# Patient Record
Sex: FEMALE | Race: BLACK OR AFRICAN AMERICAN | NOT HISPANIC OR LATINO | Employment: FULL TIME | URBAN - METROPOLITAN AREA
[De-identification: names, ages, dates, MRNs, and addresses within clinical notes are randomized per-mention and may not be internally consistent; named-entity substitution may affect disease eponyms.]

---
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
## 2021-09-15 ENCOUNTER — TRANSFERRED RECORDS (OUTPATIENT)
Dept: HEALTH INFORMATION MANAGEMENT | Facility: CLINIC | Age: 21
End: 2021-09-15

## 2023-07-20 ENCOUNTER — NURSE TRIAGE (OUTPATIENT)
Dept: NURSING | Facility: CLINIC | Age: 23
End: 2023-07-20

## 2023-07-21 NOTE — TELEPHONE ENCOUNTER
Patient inquiring about rescheduling her pap smear.    Reason for Disposition    Question about upcoming scheduled test, no triage required and triager able to answer question    Protocols used: INFORMATION ONLY CALL - NO TRIAGE-A-    Geno Pena RN on 7/20/2023 at 7:43 PM

## 2023-08-09 ENCOUNTER — OFFICE VISIT (OUTPATIENT)
Dept: FAMILY MEDICINE | Facility: CLINIC | Age: 23
End: 2023-08-09
Payer: MEDICAID

## 2023-08-09 VITALS
OXYGEN SATURATION: 99 % | HEIGHT: 68 IN | WEIGHT: 276 LBS | DIASTOLIC BLOOD PRESSURE: 85 MMHG | RESPIRATION RATE: 15 BRPM | BODY MASS INDEX: 41.83 KG/M2 | HEART RATE: 84 BPM | SYSTOLIC BLOOD PRESSURE: 128 MMHG | TEMPERATURE: 98.3 F

## 2023-08-09 DIAGNOSIS — Z13.220 SCREENING FOR HYPERLIPIDEMIA: ICD-10-CM

## 2023-08-09 DIAGNOSIS — Z98.890 S/P REDUCTION MAMMOPLASTY: ICD-10-CM

## 2023-08-09 DIAGNOSIS — F32.1 CURRENT MODERATE EPISODE OF MAJOR DEPRESSIVE DISORDER WITHOUT PRIOR EPISODE (H): ICD-10-CM

## 2023-08-09 DIAGNOSIS — Z76.89 ENCOUNTER TO ESTABLISH CARE WITH NEW DOCTOR: ICD-10-CM

## 2023-08-09 DIAGNOSIS — Z00.00 ROUTINE GENERAL MEDICAL EXAMINATION AT A HEALTH CARE FACILITY: Primary | ICD-10-CM

## 2023-08-09 DIAGNOSIS — Z11.4 SCREENING FOR HIV (HUMAN IMMUNODEFICIENCY VIRUS): ICD-10-CM

## 2023-08-09 DIAGNOSIS — E66.01 CLASS 3 SEVERE OBESITY DUE TO EXCESS CALORIES WITH SERIOUS COMORBIDITY AND BODY MASS INDEX (BMI) OF 40.0 TO 44.9 IN ADULT (H): ICD-10-CM

## 2023-08-09 DIAGNOSIS — Z13.1 ENCOUNTER FOR SCREENING FOR DIABETES MELLITUS: ICD-10-CM

## 2023-08-09 DIAGNOSIS — Z11.59 NEED FOR HEPATITIS C SCREENING TEST: ICD-10-CM

## 2023-08-09 DIAGNOSIS — E66.813 CLASS 3 SEVERE OBESITY DUE TO EXCESS CALORIES WITH SERIOUS COMORBIDITY AND BODY MASS INDEX (BMI) OF 40.0 TO 44.9 IN ADULT (H): ICD-10-CM

## 2023-08-09 DIAGNOSIS — Z12.4 CERVICAL CANCER SCREENING: ICD-10-CM

## 2023-08-09 DIAGNOSIS — G43.719 INTRACTABLE CHRONIC MIGRAINE WITHOUT AURA AND WITHOUT STATUS MIGRAINOSUS: ICD-10-CM

## 2023-08-09 PROCEDURE — 99395 PREV VISIT EST AGE 18-39: CPT | Performed by: NURSE PRACTITIONER

## 2023-08-09 PROCEDURE — 99214 OFFICE O/P EST MOD 30 MIN: CPT | Mod: 25 | Performed by: NURSE PRACTITIONER

## 2023-08-09 ASSESSMENT — ENCOUNTER SYMPTOMS
JOINT SWELLING: 0
NAUSEA: 0
DIZZINESS: 0
SHORTNESS OF BREATH: 0
SORE THROAT: 0
HEMATURIA: 0
COUGH: 0
NERVOUS/ANXIOUS: 0
HEARTBURN: 0
PARESTHESIAS: 0
CONSTIPATION: 0
WEAKNESS: 0
HEADACHES: 0
CHILLS: 0
FREQUENCY: 0
ABDOMINAL PAIN: 0
MYALGIAS: 0
DYSURIA: 0
BREAST MASS: 0
PALPITATIONS: 0
HEMATOCHEZIA: 0
DIARRHEA: 0
EYE PAIN: 0
FEVER: 0
ARTHRALGIAS: 0

## 2023-08-09 ASSESSMENT — PAIN SCALES - GENERAL: PAINLEVEL: NO PAIN (0)

## 2023-08-09 ASSESSMENT — PATIENT HEALTH QUESTIONNAIRE - PHQ9
SUM OF ALL RESPONSES TO PHQ QUESTIONS 1-9: 11
10. IF YOU CHECKED OFF ANY PROBLEMS, HOW DIFFICULT HAVE THESE PROBLEMS MADE IT FOR YOU TO DO YOUR WORK, TAKE CARE OF THINGS AT HOME, OR GET ALONG WITH OTHER PEOPLE: NOT DIFFICULT AT ALL
SUM OF ALL RESPONSES TO PHQ QUESTIONS 1-9: 11

## 2023-08-09 NOTE — PROGRESS NOTES
SUBJECTIVE:   CC: Kitty is an 23 year old who presents for preventive health visit.       8/9/2023    11:41 AM   Additional Questions   Roomed by Sofia Holt       Healthy Habits:     Getting at least 3 servings of Calcium per day:  Yes    Bi-annual eye exam:  Yes    Dental care twice a year:  Yes    Sleep apnea or symptoms of sleep apnea:  None    Diet:  Carbohydrate counting and Breakfast skipped    Frequency of exercise:  2-3 days/week    Duration of exercise:  15-30 minutes    Taking medications regularly:  Yes    Medication side effects:  None    Additional concerns today:  Yes    23 year old year old female  with PMH   Patient Active Problem List   Diagnosis Code    Headache R51.9    Abnormal vision H53.9    in clinic for preventive health care exam.     In addition to the preventive visit, 30  minutes of the appointment were spent evaluating and developing a treatment plan for her additional concern(s).    ADDITIONAL PROBLEMS  Establish care:   - s/p breast reduction   - Migraine without aura and without status migrainosus, not intractable previously on Maxalt; MRI head recommended in 2022 not completed  - acne: has upcoming visit with derm in 1 week  - obesity: BMI 43; wishes to discuss weight management  - Contraception/Menstrual cycles: no pap; menarche age 13; cycles regular; denies heavy bleeding or cramping; no PMDD; no pregnancies; sexual activity with female partner  - depression: scores elevated today; denies having counseling; medication or other treatment; no SI, intent or plan  Today's PHQ-9 Score:       8/9/2023    11:25 AM   PHQ-9 SCORE   PHQ-9 Total Score MyChart 11 (Moderate depression)   PHQ-9 Total Score 11          No data to display              Have you ever done Advance Care Planning? (For example, a Health Directive, POLST, or a discussion with a medical provider or your loved ones about your wishes): No, advance care planning information given to patient to review.  Patient  declined advance care planning discussion at this time.    Social History     Tobacco Use    Smoking status: Never    Smokeless tobacco: Never   Substance Use Topics    Alcohol use: No     Alcohol/week: 0.0 standard drinks of alcohol             8/9/2023    11:23 AM   Alcohol Use   Prescreen: >3 drinks/day or >7 drinks/week? No          No data to display              Reviewed orders with patient.  Reviewed health maintenance and updated orders accordingly - Yes  Lab work is in process  Labs reviewed in EPIC  BP Readings from Last 3 Encounters:   08/18/23 122/80   08/09/23 128/85   12/15/20 107/72    Wt Readings from Last 3 Encounters:   08/18/23 128.3 kg (282 lb 14.4 oz)   08/09/23 125.2 kg (276 lb)   12/15/20 109.3 kg (241 lb) (>99 %, Z= 2.43)*     * Growth percentiles are based on [facility] (Girls, 2-20 Years) data.                    Breast Cancer Screening: no family hx  History of abnormal Pap smear: NO - age 21-29 PAP every 3 years recommended     Reviewed and updated as needed this visit by clinical staff   Tobacco  Allergies  Meds  Problems  Med Hx  Surg Hx  Fam Hx          Reviewed and updated as needed this visit by Provider   Tobacco  Allergies  Meds  Problems  Med Hx  Surg Hx  Fam Hx         Past Medical History:   Diagnosis Date    Clavicle fracture         Review of Systems   Constitutional:  Negative for chills and fever.   HENT:  Negative for congestion, ear pain, hearing loss and sore throat.    Eyes:  Positive for visual disturbance. Negative for pain.   Respiratory:  Negative for cough and shortness of breath.    Cardiovascular:  Negative for chest pain, palpitations and peripheral edema.   Gastrointestinal:  Negative for abdominal pain, constipation, diarrhea, heartburn, hematochezia and nausea.   Breasts:  Negative for tenderness, breast mass and discharge.   Genitourinary:  Negative for dysuria, frequency, genital sores, hematuria, pelvic pain, urgency, vaginal bleeding and vaginal  "discharge.   Musculoskeletal:  Negative for arthralgias, joint swelling and myalgias.   Skin:  Negative for rash.   Neurological:  Negative for dizziness, weakness, headaches and paresthesias.   Psychiatric/Behavioral:  Negative for mood changes. The patient is not nervous/anxious.           OBJECTIVE:   /85 (BP Location: Right arm, Patient Position: Sitting, Cuff Size: Adult Large)   Pulse 84   Temp 98.3  F (36.8  C) (Temporal)   Resp 15   Ht 1.715 m (5' 7.5\")   Wt 125.2 kg (276 lb)   LMP 07/26/2023   SpO2 99%   BMI 42.59 kg/m    Physical Exam  GENERAL: healthy, alert and no distress  EYES: Eyes grossly normal to inspection, PERRL and conjunctivae and sclerae normal  HENT: ear canals and TM's normal, nose and mouth without ulcers or lesions  NECK: no adenopathy, no asymmetry, masses, or scars and thyroid normal to palpation  RESP: lungs clear to auscultation - no rales, rhonchi or wheezes  BREAST: normal without masses, tenderness or nipple discharge and no palpable axillary masses or adenopathy  CV: regular rate and rhythm, normal S1 S2, no S3 or S4, no murmur, click or rub, no peripheral edema and peripheral pulses strong  ABDOMEN: soft, nontender, no hepatosplenomegaly, no masses and bowel sounds normal   (female): deferred  MS: no gross musculoskeletal defects noted, no edema  SKIN: no suspicious lesions or rashes  NEURO: Normal strength and tone, mentation intact and speech normal  PSYCH: mentation appears normal, affect normal/bright    Diagnostic Test Results:  Labs reviewed in Epic    ASSESSMENT/PLAN:   Kitty was seen today for physical.    Diagnoses and all orders for this visit:    Routine general medical examination at a health care facility  Preventative exam w/no abnormalities and/or concerns listed in diagnoses; discussed health maintenance screenings including prostate, breast, cervical and colorectal ca screenings related to gender;  reviewed and reconciled medication, medical " "history and patient related health concerns  Plan: obtain metabolic labs    -     REVIEW OF HEALTH MAINTENANCE PROTOCOL ORDERS    Screening for HIV (human immunodeficiency virus)  Need for hepatitis C screening test  Discussed; low risk; declined     Cervical cancer screening  Attempted pap today; not successful; additional size speculum not in room; given patients first pap will defer and reattempt at next visit with correct stock in room; discussed with patient agreeable w/plan; cycles regular; will check preventive tsh, ferritin and h/h in menstruating female   -     Ferritin; Future  -     CBC with platelets; Future  -     TSH with free T4 reflex; Future    Encounter to establish care with new doctor  Reviewed chronic health conditions; medications, labs and pertinent health concerns today    Intractable chronic migraine without aura and without status migrainosus  Stable; continue to monitor    Class 3 severe obesity due to excess calories with serious comorbidity and body mass index (BMI) of 40.0 to 44.9 in adult (H)    Estimated body mass index is 42.59 kg/m  as calculated from the following:    Height as of this encounter: 1.715 m (5' 7.5\").    Weight as of this encounter: 125.2 kg (276 lb).  - follow up in 1-2 weeks to discuss management d/t time  - check nutritional status; A1c, Lipid, Tsh, Vit D, B12    -     Hemoglobin A1c; Future  -     TSH with free T4 reflex; Future  -     Vitamin B12; Future  -     Vitamin D Deficiency; Future    S/P reduction mammoplasty  - stable w/ increase size; suspect with weight loss will reduce to postsurgical size    Current moderate episode of major depressive disorder without prior episode (H)  PHQ9 score elevated; denies si, intent or plan; family stressors; agreeable to follow up in 1 week to discuss treatment options  -     TSH with free T4 reflex; Future  -     Vitamin B12; Future  -     Vitamin D Deficiency; Future    Encounter for screening for diabetes mellitus  -   " "  Hemoglobin A1c; Future    Screening for hyperlipidemia  Prior test:    2 yr ago     CHOLESTEROL,TOTAL 100 - 199 mg/dL 214 High    TRIGLYCERIDES <150 mg/dL 87   HDL CHOLESTEROL >40 mg/dL 70   NON-HDL CHOLESTEROL <145 mg/dl 144   CHOL/HDL RATIO           <4.50 3.06   LDL CHOLESTEROL <=130 mg/dL 127   VLDL CHOLESTEROL mg/dL 17    Continue to monitor with weight loss      Patient has been advised of split billing requirements and indicates understanding: Yes    Depression Screening Follow Up        2023    11:25 AM   PHQ   PHQ-9 Total Score 11   Q9: Thoughts of better off dead/self-harm past 2 weeks Not at all         2023    11:25 AM   Last PHQ-9   1.  Little interest or pleasure in doing things 2   2.  Feeling down, depressed, or hopeless 2   3.  Trouble falling or staying asleep, or sleeping too much 2   4.  Feeling tired or having little energy 2   5.  Poor appetite or overeating 3   6.  Feeling bad about yourself 0   7.  Trouble concentrating 0   8.  Moving slowly or restless 0   Q9: Thoughts of better off dead/self-harm past 2 weeks 0   PHQ-9 Total Score 11       Follow Up Actions Taken  Crisis resource information provided in After Visit Summary  Referred patient back to PCP       COUNSELING:  Reviewed preventive health counseling, as reflected in patient instructions       Regular exercise       Healthy diet/nutrition       Contraception       Safe sex practices/STD prevention       Syphilis screening for high risk patients          HIV screeninx in teen years, 1x in adult years, and at intervals if high risk      BMI:   Estimated body mass index is 42.59 kg/m  as calculated from the following:    Height as of this encounter: 1.715 m (5' 7.5\").    Weight as of this encounter: 125.2 kg (276 lb).   Weight management plan: Discussed healthy diet and exercise guidelines      She reports that she has never smoked. She has never used smokeless tobacco.          IMELDA Girard [facility] " [facility] submitted by the patient for this visit:  Patient Health Questionnaire (Submitted on 8/9/2023)  If you checked off any problems, how difficult have these problems made it for you to do your work, take care of things at home, or get along with other people?: Not difficult at all  PHQ9 TOTAL SCORE: 11

## 2023-08-18 ENCOUNTER — OFFICE VISIT (OUTPATIENT)
Dept: FAMILY MEDICINE | Facility: CLINIC | Age: 23
End: 2023-08-18
Payer: MEDICAID

## 2023-08-18 VITALS
TEMPERATURE: 98.8 F | WEIGHT: 282.9 LBS | RESPIRATION RATE: 18 BRPM | HEART RATE: 88 BPM | DIASTOLIC BLOOD PRESSURE: 80 MMHG | BODY MASS INDEX: 43.65 KG/M2 | SYSTOLIC BLOOD PRESSURE: 122 MMHG | OXYGEN SATURATION: 99 %

## 2023-08-18 DIAGNOSIS — E66.812 CLASS 2 SEVERE OBESITY DUE TO EXCESS CALORIES WITH SERIOUS COMORBIDITY AND BODY MASS INDEX (BMI) OF 36.0 TO 36.9 IN ADULT (H): Primary | ICD-10-CM

## 2023-08-18 DIAGNOSIS — Z00.00 ROUTINE GENERAL MEDICAL EXAMINATION AT A HEALTH CARE FACILITY: ICD-10-CM

## 2023-08-18 DIAGNOSIS — E66.01 CLASS 2 SEVERE OBESITY DUE TO EXCESS CALORIES WITH SERIOUS COMORBIDITY AND BODY MASS INDEX (BMI) OF 36.0 TO 36.9 IN ADULT (H): Primary | ICD-10-CM

## 2023-08-18 LAB
DEPRECATED CALCIDIOL+CALCIFEROL SERPL-MC: 20 UG/L (ref 20–75)
ERYTHROCYTE [DISTWIDTH] IN BLOOD BY AUTOMATED COUNT: 13.9 % (ref 10–15)
FERRITIN SERPL-MCNC: 119 NG/ML (ref 6–175)
HBA1C MFR BLD: 5.8 % (ref 0–5.6)
HCT VFR BLD AUTO: 38.7 % (ref 35–47)
HGB BLD-MCNC: 12.4 G/DL (ref 11.7–15.7)
MCH RBC QN AUTO: 27.3 PG (ref 26.5–33)
MCHC RBC AUTO-ENTMCNC: 32 G/DL (ref 31.5–36.5)
MCV RBC AUTO: 85 FL (ref 78–100)
PLATELET # BLD AUTO: 331 10E3/UL (ref 150–450)
RBC # BLD AUTO: 4.54 10E6/UL (ref 3.8–5.2)
TSH SERPL DL<=0.005 MIU/L-ACNC: 2.31 UIU/ML (ref 0.3–4.2)
VIT B12 SERPL-MCNC: 1204 PG/ML (ref 232–1245)
WBC # BLD AUTO: 6.7 10E3/UL (ref 4–11)

## 2023-08-18 PROCEDURE — 82306 VITAMIN D 25 HYDROXY: CPT | Performed by: NURSE PRACTITIONER

## 2023-08-18 PROCEDURE — 82607 VITAMIN B-12: CPT | Performed by: NURSE PRACTITIONER

## 2023-08-18 PROCEDURE — 99214 OFFICE O/P EST MOD 30 MIN: CPT | Performed by: NURSE PRACTITIONER

## 2023-08-18 PROCEDURE — 36415 COLL VENOUS BLD VENIPUNCTURE: CPT | Performed by: NURSE PRACTITIONER

## 2023-08-18 PROCEDURE — 84443 ASSAY THYROID STIM HORMONE: CPT | Performed by: NURSE PRACTITIONER

## 2023-08-18 PROCEDURE — 82728 ASSAY OF FERRITIN: CPT | Performed by: NURSE PRACTITIONER

## 2023-08-18 PROCEDURE — 85027 COMPLETE CBC AUTOMATED: CPT | Performed by: NURSE PRACTITIONER

## 2023-08-18 PROCEDURE — 83036 HEMOGLOBIN GLYCOSYLATED A1C: CPT | Performed by: NURSE PRACTITIONER

## 2023-08-18 RX ORDER — PHENTERMINE HYDROCHLORIDE 15 MG/1
15 CAPSULE ORAL EVERY MORNING
Qty: 30 CAPSULE | Refills: 0 | Status: SHIPPED | OUTPATIENT
Start: 2023-08-18 | End: 2023-09-18

## 2023-08-18 NOTE — PROGRESS NOTES
"  Assessment & Plan     1. Class 2 severe obesity due to excess calories with serious comorbidity and body mass index (BMI) of 36.0 to 36.9 in adult (H)    Estimated body mass index is 43.65 kg/m  as calculated from the following:    Height as of 8/9/23: 1.715 m (5' 7.5\").    Weight as of this encounter: 128.3 kg (282 lb 14.4 oz).  Mother has been on phentermine in the past   discussed treatment options w/phentermine, topiramate, natrexone, wellbutrin and glp-1 therapy; reviewed side effects of all medications;   - discussed/encouraged lifestyle changes including starting an exercise/activity program 30 minutes daily, daily caloric /cho/protein monitoring;and meal tracking;    - will start reducing caloric intake 1800-2K per day; cho 100-150 gms per meal; protein 25-30 gm per meal  - at this time select meal monitoring edgar; start tracking; start medication; follow up in 1 month  - phentermine (ADIPEX-P) 15 MG capsule; Take 1 capsule (15 mg) by mouth every morning  Dispense: 30 capsule; Refill: 0; no insurance at this time using [employer] [facility]; reviewed cost of GLP1    2. Routine general medical examination at a health care facility  Obtain labs today; deferred last visit d/t her schedule  - Ferritin  - CBC with platelets  - Hemoglobin A1c  - TSH with free T4 reflex  - Vitamin B12  - Vitamin D Deficiency         Patient Instructions    discussed treatment options w/phentermine,   -  lifestyle changes including starting an exercise/activity program 30 minutes daily, daily caloric/cho/protein;   - meal tracking;    - reduce caloric intake 1800-2K per day;   carbohydrates to 100-150 gms per meal;  protein 25-30 gm per meal        Follow up in 1 month    IMELDA Girard CNP  [facility] SEVERINO Tang is a 23 year old, presenting for the following health issues:  Results and Weight Check      8/18/2023    11:02 AM   Additional Questions   Roomed by JIL KING       History of Present Illness "       Reason for visit:  Pap smear & weight concerns    She eats 2-3 servings of fruits and vegetables daily.She consumes 1 sweetened beverage(s) daily.She exercises with enough effort to increase her heart rate 20 to 29 minutes per day.  She exercises with enough effort to increase her heart rate 3 or less days per week.   She is taking medications regularly.    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; needs to  additional job to cover daily needs; denies SI, intent or plan;      - Weight  management: started dietary changes over the past several months including intermittent fasting, reducing carbohydrates rice, breads, potatoes, sugary drinks; fasting now going well; denies night time snacking; + constant thinking about food; no grazing; current weight is highest weight;   Weight [id number] 182 lbs; 2018-present w/steady increase in weight to 282 lbs today (2023); gained 100 lbs over 4-5 yrs; s/p breast reduction to aid in weight loss            Review of Systems         Objective    /80 (BP Location: Right arm, Patient Position: Sitting, Cuff Size: Adult Large)   Pulse 88   Temp 98.8  F (37.1  C) (Temporal)   Resp 18   Wt 128.3 kg (282 lb 14.4 oz)   LMP 07/26/2023   SpO2 99%   BMI 43.65 kg/m    Body mass index is 43.65 kg/m .  Physical Exam

## 2023-08-18 NOTE — PATIENT INSTRUCTIONS
discussed treatment options w/phentermine,   -  lifestyle changes including starting an exercise/activity program 30 minutes daily, daily caloric/cho/protein;   - meal tracking;    - reduce caloric intake 1800-2K per day;   carbohydrates to 100-150 gms per meal;  protein 25-30 gm per meal        Follow up in 1 month

## 2023-08-18 NOTE — LETTER
August 20, 2023      Kitty Stoddard  [address] [facility] [address]        Dear ,    We are writing to inform you of your test results.    Your test results fall within the expected range(s) or remain unchanged from previous results.  Except your A1c (diabetes number) --is slightly elevated which is a  sign of early diabetes (Prediabetes).  I advise continue your weight management changes including a low carbohydrate diet, continue exercising ~30 minutes a day (walking after meals is great!), and rechecking your glucose level in 6 months.     See you in 1 month!      Resulted Orders   Ferritin   Result Value Ref Range    Ferritin 119 6 - 175 ng/mL   CBC with platelets   Result Value Ref Range    WBC Count 6.7 4.0 - 11.0 10e3/uL    RBC Count 4.54 3.80 - 5.20 10e6/uL    Hemoglobin 12.4 11.7 - 15.7 g/dL    Hematocrit 38.7 35.0 - 47.0 %    MCV 85 78 - 100 fL    MCH 27.3 26.5 - 33.0 pg    MCHC 32.0 31.5 - 36.5 g/dL    RDW 13.9 10.0 - 15.0 %    Platelet Count 331 150 - 450 10e3/uL   Hemoglobin A1c   Result Value Ref Range    Hemoglobin A1C 5.8 (H) 0.0 - 5.6 %      Comment:      Normal <5.7%   Prediabetes 5.7-6.4%    Diabetes 6.5% or higher     Note: Adopted from ADA consensus guidelines.   TSH with free T4 reflex   Result Value Ref Range    TSH 2.31 0.30 - 4.20 uIU/mL   Vitamin B12   Result Value Ref Range    Vitamin B12 1,204 232 - 1,245 pg/mL   Vitamin D Deficiency   Result Value Ref Range    Vitamin D, Total (25-Hydroxy) 20 20 - 75 ug/L    Narrative    Season, race, dietary intake, and treatment affect the concentration of 25-hydroxy-Vitamin D. Values may decrease during winter months and increase during summer months. Values 20-29 ug/L may indicate Vitamin D insufficiency and values <20 ug/L may indicate Vitamin D deficiency.    Vitamin D determination is routinely performed by an immunoassay specific for 25 hydroxyvitamin D3.  If an individual is on vitamin D2(ergocalciferol) supplementation, please  specify 25 OH vitamin D2 and D3 level determination by LCMSMS test [id number].         If you have any questions or concerns, please call the clinic at the number listed above.       Sincerely,      IMELDA Girard CNP

## 2023-08-21 ENCOUNTER — TELEPHONE (OUTPATIENT)
Dept: FAMILY MEDICINE | Facility: CLINIC | Age: 23
End: 2023-08-21
Payer: MEDICAID

## 2023-08-23 NOTE — TELEPHONE ENCOUNTER
Medication: phentermine (ADIPEX-P) 15 MG capsule    Patient does not have active insurance coverage. Patient has already picked up med using discount card.    Pharmacy Notified:  Yes- pt has picked up med using discount card.  Patient Notified:      Please close encounter when finished.    Thank you,  Central Prior Authorization Team  [phone]

## 2023-09-18 ENCOUNTER — OFFICE VISIT (OUTPATIENT)
Dept: FAMILY MEDICINE | Facility: CLINIC | Age: 23
End: 2023-09-18
Payer: COMMERCIAL

## 2023-09-18 VITALS
HEIGHT: 66 IN | OXYGEN SATURATION: 100 % | WEIGHT: 267 LBS | SYSTOLIC BLOOD PRESSURE: 110 MMHG | BODY MASS INDEX: 42.91 KG/M2 | DIASTOLIC BLOOD PRESSURE: 60 MMHG | HEART RATE: 98 BPM | RESPIRATION RATE: 20 BRPM | TEMPERATURE: 97.6 F

## 2023-09-18 DIAGNOSIS — E66.813 CLASS 3 SEVERE OBESITY DUE TO EXCESS CALORIES WITH SERIOUS COMORBIDITY AND BODY MASS INDEX (BMI) OF 40.0 TO 44.9 IN ADULT (H): Primary | ICD-10-CM

## 2023-09-18 DIAGNOSIS — E66.01 CLASS 3 SEVERE OBESITY DUE TO EXCESS CALORIES WITH SERIOUS COMORBIDITY AND BODY MASS INDEX (BMI) OF 40.0 TO 44.9 IN ADULT (H): Primary | ICD-10-CM

## 2023-09-18 PROCEDURE — 99214 OFFICE O/P EST MOD 30 MIN: CPT | Performed by: NURSE PRACTITIONER

## 2023-09-18 RX ORDER — TRETINOIN 0.025 %
CREAM (GRAM) TOPICAL AT BEDTIME
COMMUNITY
Start: 2023-09-07

## 2023-09-18 RX ORDER — AZELAIC ACID 0.15 G/G
GEL TOPICAL 2 TIMES DAILY
COMMUNITY
Start: 2023-09-08

## 2023-09-18 RX ORDER — PHENTERMINE HYDROCHLORIDE 37.5 MG/1
37.5 TABLET ORAL
Qty: 90 TABLET | Refills: 1 | Status: SHIPPED | OUTPATIENT
Start: 2023-09-18 | End: 2023-09-29

## 2023-09-18 ASSESSMENT — PAIN SCALES - GENERAL: PAINLEVEL: NO PAIN (0)

## 2023-09-18 ASSESSMENT — PATIENT HEALTH QUESTIONNAIRE - PHQ9: SUM OF ALL RESPONSES TO PHQ QUESTIONS 1-9: 0

## 2023-09-18 NOTE — PROGRESS NOTES
"  Assessment & Plan     Class 3 severe obesity due to excess calories with serious comorbidity and body mass index (BMI) of 40.0 to 44.9 in adult (H)    Estimated body mass index is 43.09 kg/m  as calculated from the following:    Height as of this encounter: 1.676 m (5' 6\").    Weight as of this encounter: 121.1 kg (267 lb).  Tolerating phentermine intermittent sleep concerns; discussed taking earlier in the morning; appetite increasing mid-morning wishes to increase to 37.5 mg; will monitor for insomnia and palpitations  Continues lifestyle changes including starting an exercise/activity program 30 minutes daily,   daily caloric/cho/protein; meal tracking;    - reduce caloric intake 1800-2K per day; cho 100-150 gms per meal; protein 25-30 gm per meal        IMELDA Girard CNP  [facility]    Mi Tang is a 23 year old, presenting for the following health issues:  Follow Up (New medication)      9/18/2023    11:13 AM   Additional Questions   Roomed by Maria Isabel   Accompanied by alone         9/18/2023    11:13 AM   Patient Reported Additional Medications   Patient reports taking the following new medications none       History of Present Illness       Reason for visit:  Follow up for medication    She eats 2-3 servings of fruits and vegetables daily.She consumes 1 sweetened beverage(s) daily.She exercises with enough effort to increase her heart rate 20 to 29 minutes per day.  She exercises with enough effort to increase her heart rate 3 or less days per week. She is missing 4 dose(s) of medications per week.  She is not taking prescribed medications regularly due to remembering to take.     Phentermine 15 mg tolerating well; taking in the mornings 8-9 am occasional missed doses if wakes up late; notices increase heart rate at night with falling asleep; denies shortness of breath, chest pain; no radiating pain     Intermittent fasting 12 noon-8pm; increase appetite ~10 am " "  Stopped potatoes and rice; reduced appetite; eating until satisfied vs full   Vaping w/nicotine at night             Review of Systems         Objective    /60 (BP Location: Right arm, Patient Position: Sitting, Cuff Size: Adult Large)   Pulse 98   Temp 97.6  F (36.4  C) (Tympanic)   Resp 20   Ht 1.676 m (5' 6\")   Wt 121.1 kg (267 lb)   LMP 07/26/2023   SpO2 100%   BMI 43.09 kg/m    Body mass index is 43.09 kg/m .  Physical Exam                         "

## 2023-09-18 NOTE — PATIENT INSTRUCTIONS
-daily caloric/carbohydrates/protein; meal tracking;    - reduce caloric intake 1800 per day;   - carbs 100-150 gms per day  - protein 25-30 gm per meal

## 2023-09-24 ENCOUNTER — MYC MEDICAL ADVICE (OUTPATIENT)
Dept: FAMILY MEDICINE | Facility: CLINIC | Age: 23
End: 2023-09-24
Payer: COMMERCIAL

## 2023-09-24 DIAGNOSIS — E66.813 CLASS 3 SEVERE OBESITY DUE TO EXCESS CALORIES WITH SERIOUS COMORBIDITY AND BODY MASS INDEX (BMI) OF 40.0 TO 44.9 IN ADULT (H): ICD-10-CM

## 2023-09-24 DIAGNOSIS — E66.01 CLASS 3 SEVERE OBESITY DUE TO EXCESS CALORIES WITH SERIOUS COMORBIDITY AND BODY MASS INDEX (BMI) OF 40.0 TO 44.9 IN ADULT (H): ICD-10-CM

## 2023-09-25 NOTE — TELEPHONE ENCOUNTER
Spoke to Shan and plan is can either go back to the 15mg a day if patient is still seeing some appetite suppression or could try 30 mg a day to go up a little more slowly.    Patient would like to try the 30 mg a day as she was not getting enough of the appetite suppression on 15.  Pharmacy is loaded.  States she will also try to take it a little earlier in the morning.  Patent is not on Topiramate at this time.  Pharmacy loaded.  Anisha Ascencio RN  [facility]

## 2023-09-25 NOTE — TELEPHONE ENCOUNTER
Patient stated that since she increased from 15 mg Phentermine to the 37.5 mg she has had heart racing.  States that she feels SOB with this, has not been able to really take her pulse.  Denies chest pain.  Only happens when she is trying to go to sleep.  She takes her dose at 9 AM.  Please advise.  Anisha Ascencio RN  [facility]

## 2023-09-29 RX ORDER — PHENTERMINE HYDROCHLORIDE 30 MG/1
30 CAPSULE ORAL EVERY MORNING
Qty: 30 CAPSULE | Refills: 1 | Status: SHIPPED | OUTPATIENT
Start: 2023-09-29 | End: 2023-11-28

## 2023-10-06 ENCOUNTER — MYC MEDICAL ADVICE (OUTPATIENT)
Dept: FAMILY MEDICINE | Facility: CLINIC | Age: 23
End: 2023-10-06
Payer: COMMERCIAL

## 2024-11-03 ENCOUNTER — HEALTH MAINTENANCE LETTER (OUTPATIENT)
Age: 24
End: 2024-11-03